# Patient Record
Sex: MALE | Race: WHITE
[De-identification: names, ages, dates, MRNs, and addresses within clinical notes are randomized per-mention and may not be internally consistent; named-entity substitution may affect disease eponyms.]

---

## 2018-07-25 ENCOUNTER — HOSPITAL ENCOUNTER (OUTPATIENT)
Dept: HOSPITAL 83 - SDC | Age: 53
Discharge: HOME | End: 2018-07-25
Attending: INTERNAL MEDICINE
Payer: COMMERCIAL

## 2018-07-25 VITALS — DIASTOLIC BLOOD PRESSURE: 48 MMHG

## 2018-07-25 VITALS — WEIGHT: 185 LBS | BODY MASS INDEX: 28.04 KG/M2 | HEIGHT: 67.99 IN

## 2018-07-25 VITALS — DIASTOLIC BLOOD PRESSURE: 54 MMHG

## 2018-07-25 VITALS — DIASTOLIC BLOOD PRESSURE: 82 MMHG

## 2018-07-25 VITALS — DIASTOLIC BLOOD PRESSURE: 70 MMHG

## 2018-07-25 DIAGNOSIS — K57.30: ICD-10-CM

## 2018-07-25 DIAGNOSIS — K29.50: ICD-10-CM

## 2018-07-25 DIAGNOSIS — E11.9: ICD-10-CM

## 2018-07-25 DIAGNOSIS — Z12.11: Primary | ICD-10-CM

## 2018-07-25 DIAGNOSIS — Z87.891: ICD-10-CM

## 2018-07-25 DIAGNOSIS — Z98.890: ICD-10-CM

## 2018-07-25 DIAGNOSIS — Z79.899: ICD-10-CM

## 2018-10-12 ENCOUNTER — APPOINTMENT (OUTPATIENT)
Dept: CT IMAGING | Age: 53
End: 2018-10-12
Payer: COMMERCIAL

## 2018-10-12 ENCOUNTER — HOSPITAL ENCOUNTER (EMERGENCY)
Age: 53
Discharge: HOME | End: 2018-10-12
Payer: COMMERCIAL

## 2018-10-12 ENCOUNTER — APPOINTMENT (OUTPATIENT)
Dept: GENERAL RADIOLOGY | Age: 53
End: 2018-10-12
Payer: COMMERCIAL

## 2018-10-12 VITALS
WEIGHT: 179 LBS | SYSTOLIC BLOOD PRESSURE: 142 MMHG | BODY MASS INDEX: 26.51 KG/M2 | HEART RATE: 96 BPM | TEMPERATURE: 98.2 F | HEIGHT: 69 IN | OXYGEN SATURATION: 97 % | DIASTOLIC BLOOD PRESSURE: 80 MMHG | RESPIRATION RATE: 17 BRPM

## 2018-10-12 DIAGNOSIS — S20.212A CONTUSION OF RIB ON LEFT SIDE, INITIAL ENCOUNTER: ICD-10-CM

## 2018-10-12 DIAGNOSIS — S01.81XA FACIAL LACERATION, INITIAL ENCOUNTER: ICD-10-CM
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
## 2018-10-12 PROCEDURE — 99283 EMERGENCY DEPT VISIT LOW MDM: CPT

## 2018-10-12 PROCEDURE — 72125 CT NECK SPINE W/O DYE: CPT

## 2018-10-12 PROCEDURE — 90471 IMMUNIZATION ADMIN: CPT | Performed by: NURSE PRACTITIONER

## 2018-10-12 PROCEDURE — 70450 CT HEAD/BRAIN W/O DYE: CPT

## 2018-10-12 PROCEDURE — 6370000000 HC RX 637 (ALT 250 FOR IP): Performed by: NURSE PRACTITIONER

## 2018-10-12 PROCEDURE — 12013 RPR F/E/E/N/L/M 2.6-5.0 CM: CPT

## 2018-10-12 PROCEDURE — 90715 TDAP VACCINE 7 YRS/> IM: CPT | Performed by: NURSE PRACTITIONER

## 2018-10-12 PROCEDURE — 6360000002 HC RX W HCPCS: Performed by: NURSE PRACTITIONER

## 2018-10-12 PROCEDURE — 71101 X-RAY EXAM UNILAT RIBS/CHEST: CPT

## 2018-10-12 RX ORDER — ACETAMINOPHEN 500 MG
1000 TABLET ORAL ONCE
Status: COMPLETED | OUTPATIENT
Start: 2018-10-12 | End: 2018-10-12

## 2018-10-12 RX ORDER — IBUPROFEN 800 MG/1
800 TABLET ORAL EVERY 8 HOURS PRN
Qty: 21 TABLET | Refills: 0 | Status: SHIPPED | OUTPATIENT
Start: 2018-10-12 | End: 2018-10-19

## 2018-10-12 RX ORDER — DIAPER,BRIEF,INFANT-TODD,DISP
EACH MISCELLANEOUS ONCE
Status: COMPLETED | OUTPATIENT
Start: 2018-10-12 | End: 2018-10-12

## 2018-10-12 RX ORDER — IBUPROFEN 800 MG/1
800 TABLET ORAL EVERY 8 HOURS PRN
Qty: 21 TABLET | Refills: 0 | Status: SHIPPED | OUTPATIENT
Start: 2018-10-12 | End: 2018-10-12

## 2018-10-12 RX ADMIN — BACITRACIN ZINC: 500 OINTMENT TOPICAL at 15:53

## 2018-10-12 RX ADMIN — ACETAMINOPHEN 1000 MG: 500 TABLET, FILM COATED ORAL at 15:53

## 2018-10-12 RX ADMIN — TETANUS TOXOID, REDUCED DIPHTHERIA TOXOID AND ACELLULAR PERTUSSIS VACCINE, ADSORBED 0.5 ML: 5; 2.5; 8; 8; 2.5 SUSPENSION INTRAMUSCULAR at 15:46

## 2018-10-12 ASSESSMENT — PAIN SCALES - GENERAL: PAINLEVEL_OUTOF10: 4

## 2018-10-13 NOTE — ED PROVIDER NOTES
Contrast   Final Result   Negative for evidence of acute traumatic findings of the cervical   spine or adjacent soft tissues. Degenerative osteoarthropathy of the cervical spine. CT Head WO Contrast   Final Result   1. Evidence of prior right frontoparietal craniotomy. Cortical   subcortical encephalomalacia of the right frontal lobe   posterosuperiorly could represent old infarct or related to   postsurgical changes. Clinical correlation is recommended. 2. Age-related atrophic changes without evidence of acute intracranial   pathology. If there is concern for CVA, MRI is more sensitive for the detection   of early stages of acute ischemic infarct. ED Course / Medical Decision Making     Medications   Tetanus-Diphth-Acell Pertussis (BOOSTRIX) injection 0.5 mL (0.5 mLs Intramuscular Given 10/12/18 1546)   bacitracin zinc ointment ( Topical Given 10/12/18 1553)   acetaminophen (TYLENOL) tablet 1,000 mg (1,000 mg Oral Given 10/12/18 1553)         Consult(s):   None    Procedure(s):     PROCEDURE NOTE  10/12/18       Time: 1330    LACERATION REPAIR  Risks, benefits and alternatives (for applicable procedures below) described. Performed By: Lennox Rend, APRN - CNP. Laceration #: 1. Location: left eyebrow  Length:   3cm. The wound area was irrigated with sterile saline, cleansend with shur-clens and draped in a sterile fashion. Local Anesthesia:  Lidocaine 1% with epinephrine. The wound was explored with the following results:  no foreign body or tendon injury seen. Debridement: None. Undermining: None. Wound Margins Revised: None. Flaps Aligned: yes. The wound was closed with 5-0 Prolene using interrupted sutures. Dressing:  bacitracin and a sterile dressing was placed. Total number suture:  5. There were no additional lacerations requiring repair.          MDM:   Mechanical fall, CT head and cervical spine obtained, negative for any acute findings,

## 2019-01-11 ENCOUNTER — HOSPITAL ENCOUNTER (EMERGENCY)
Dept: HOSPITAL 83 - ED | Age: 54
Discharge: HOME | End: 2019-01-11
Payer: COMMERCIAL

## 2019-01-11 VITALS — WEIGHT: 192 LBS | HEIGHT: 68.98 IN | BODY MASS INDEX: 28.44 KG/M2

## 2019-01-11 DIAGNOSIS — R51: Primary | ICD-10-CM

## 2019-01-11 DIAGNOSIS — Z79.82: ICD-10-CM

## 2019-01-11 DIAGNOSIS — Z79.899: ICD-10-CM

## 2019-01-11 DIAGNOSIS — R79.1: ICD-10-CM

## 2019-01-11 LAB
ALBUMIN SERPL-MCNC: 3.9 GM/DL (ref 3.1–4.5)
ALP SERPL-CCNC: 85 U/L (ref 45–117)
ALT SERPL W P-5'-P-CCNC: 39 U/L (ref 12–78)
APTT PPP: 22.3 SECONDS (ref 20.8–31.5)
AST SERPL-CCNC: 18 IU/L (ref 3–35)
BASOPHILS # BLD AUTO: 0 10*3/UL (ref 0–0.1)
BASOPHILS NFR BLD AUTO: 0.8 % (ref 0–1)
BUN SERPL-MCNC: 19 MG/DL (ref 7–24)
CHLORIDE SERPL-SCNC: 106 MMOL/L (ref 98–107)
CREAT SERPL-MCNC: 1.03 MG/DL (ref 0.7–1.3)
EOSINOPHIL # BLD AUTO: 0.1 10*3/UL (ref 0–0.4)
EOSINOPHIL # BLD AUTO: 2.3 % (ref 1–4)
ERYTHROCYTE [DISTWIDTH] IN BLOOD BY AUTOMATED COUNT: 12.4 % (ref 0–14.5)
HCT VFR BLD AUTO: 45.4 % (ref 42–52)
HGB BLD-MCNC: 15.4 G/DL (ref 14–18)
INR BLD: 1 (ref 2–3.5)
LYMPHOCYTES # BLD AUTO: 1.5 10*3/UL (ref 1.3–4.4)
LYMPHOCYTES NFR BLD AUTO: 28.4 % (ref 27–41)
MCH RBC QN AUTO: 30.7 PG (ref 27–31)
MCHC RBC AUTO-ENTMCNC: 33.9 G/DL (ref 33–37)
MCV RBC AUTO: 90.6 FL (ref 80–94)
MONOCYTES # BLD AUTO: 0.5 10*3/UL (ref 0.1–1)
MONOCYTES NFR BLD MANUAL: 9 % (ref 3–9)
NEUT #: 3.1 10*3/UL (ref 2.3–7.9)
NEUT %: 59.1 % (ref 47–73)
NRBC BLD QL AUTO: 0 % (ref 0–0)
PLATELET # BLD AUTO: 167 10*3/UL (ref 130–400)
PMV BLD AUTO: 9.6 FL (ref 9.6–12.3)
POTASSIUM SERPL-SCNC: 4.2 MMOL/L (ref 3.5–5.1)
PROT SERPL-MCNC: 6.9 GM/DL (ref 6.4–8.2)
RBC # BLD AUTO: 5.01 10*6/UL (ref 4.5–5.9)
SODIUM SERPL-SCNC: 141 MMOL/L (ref 136–145)
WBC NRBC COR # BLD AUTO: 5.2 10*3/UL (ref 4.8–10.8)

## 2019-07-30 ENCOUNTER — HOSPITAL ENCOUNTER (OUTPATIENT)
Dept: HOSPITAL 83 - US | Age: 54
Discharge: HOME | End: 2019-07-30
Payer: COMMERCIAL

## 2019-07-30 DIAGNOSIS — E78.5: ICD-10-CM

## 2019-07-30 DIAGNOSIS — E11.9: ICD-10-CM

## 2019-07-30 DIAGNOSIS — I10: ICD-10-CM

## 2019-07-30 DIAGNOSIS — R10.31: Primary | ICD-10-CM

## 2019-08-20 ENCOUNTER — HOSPITAL ENCOUNTER (OUTPATIENT)
Dept: HOSPITAL 83 - US | Age: 54
Discharge: HOME | End: 2019-08-20
Payer: COMMERCIAL

## 2019-08-20 DIAGNOSIS — N20.0: ICD-10-CM

## 2019-08-20 DIAGNOSIS — N28.9: Primary | ICD-10-CM

## 2019-08-25 ENCOUNTER — HOSPITAL ENCOUNTER (OUTPATIENT)
Dept: HOSPITAL 83 - LAB | Age: 54
Discharge: HOME | End: 2019-08-25
Attending: INTERNAL MEDICINE
Payer: COMMERCIAL

## 2019-08-25 DIAGNOSIS — E55.9: Primary | ICD-10-CM

## 2019-08-25 DIAGNOSIS — E11.65: ICD-10-CM

## 2019-08-25 LAB
ALBUMIN SERPL-MCNC: 4 GM/DL (ref 3.1–4.5)
ALP SERPL-CCNC: 97 U/L (ref 45–117)
ALT SERPL W P-5'-P-CCNC: 29 U/L (ref 12–78)
AST SERPL-CCNC: 20 IU/L (ref 3–35)
BUN SERPL-MCNC: 12 MG/DL (ref 7–24)
CHLORIDE SERPL-SCNC: 105 MMOL/L (ref 98–107)
CHOLEST SERPL-MCNC: 100 MG/DL (ref ?–200)
CREAT SERPL-MCNC: 1.14 MG/DL (ref 0.7–1.3)
HDLC SERPL-MCNC: 30 MG/DL (ref 40–60)
LDLC SERPL DIRECT ASSAY-MCNC: 36 MG/DL (ref 9–159)
POTASSIUM SERPL-SCNC: 3.9 MMOL/L (ref 3.5–5.1)
PROT SERPL-MCNC: 7.1 GM/DL (ref 6.4–8.2)
SODIUM SERPL-SCNC: 139 MMOL/L (ref 136–145)
TRIGL SERPL-MCNC: 170 MG/DL (ref ?–150)
VLDLC SERPL CALC-MCNC: 34 MG/DL (ref 6–40)

## 2019-08-27 LAB
CREAT UR-MCNC: 52.2 MG/DL
MICRO ALBUMIN/CRE RATIO: 8.4 (ref 0–30)

## 2019-11-24 ENCOUNTER — HOSPITAL ENCOUNTER (OUTPATIENT)
Dept: HOSPITAL 83 - LAB | Age: 54
Discharge: HOME | End: 2019-11-24
Attending: INTERNAL MEDICINE
Payer: COMMERCIAL

## 2019-11-24 DIAGNOSIS — E55.9: ICD-10-CM

## 2019-11-24 DIAGNOSIS — E53.8: ICD-10-CM

## 2019-11-24 DIAGNOSIS — E11.65: Primary | ICD-10-CM

## 2019-11-24 LAB
25(OH)D3 SERPL-MCNC: 41.8 NG/ML (ref 30–100)
ALBUMIN SERPL-MCNC: 3.8 GM/DL (ref 3.1–4.5)
ALP SERPL-CCNC: 123 U/L (ref 45–117)
ALT SERPL W P-5'-P-CCNC: 28 U/L (ref 12–78)
AST SERPL-CCNC: 16 IU/L (ref 3–35)
BUN SERPL-MCNC: 16 MG/DL (ref 7–24)
CHLORIDE SERPL-SCNC: 105 MMOL/L (ref 98–107)
CHOLEST SERPL-MCNC: 109 MG/DL (ref ?–200)
CREAT SERPL-MCNC: 0.94 MG/DL (ref 0.7–1.3)
HDLC SERPL-MCNC: 30 MG/DL (ref 40–60)
LDLC SERPL DIRECT ASSAY-MCNC: 51 MG/DL (ref 9–159)
POTASSIUM SERPL-SCNC: 4.2 MMOL/L (ref 3.5–5.1)
PROT SERPL-MCNC: 7.5 GM/DL (ref 6.4–8.2)
SODIUM SERPL-SCNC: 140 MMOL/L (ref 136–145)
TRIGL SERPL-MCNC: 140 MG/DL (ref ?–150)
VITAMIN B12: 306 PG/ML (ref 247–911)
VLDLC SERPL CALC-MCNC: 28 MG/DL (ref 6–40)

## 2020-01-09 ENCOUNTER — HOSPITAL ENCOUNTER (EMERGENCY)
Dept: HOSPITAL 83 - ED | Age: 55
Discharge: HOME | End: 2020-01-09
Payer: COMMERCIAL

## 2020-01-09 VITALS — BODY MASS INDEX: 24.64 KG/M2 | WEIGHT: 157 LBS | HEIGHT: 66.97 IN

## 2020-01-09 DIAGNOSIS — Z79.899: ICD-10-CM
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
## 2020-01-09 DIAGNOSIS — S80.12XA: Primary | ICD-10-CM
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
## 2020-07-10 ENCOUNTER — HOSPITAL ENCOUNTER (OUTPATIENT)
Dept: HOSPITAL 83 - RESCLI | Age: 55
Discharge: HOME | End: 2020-07-10
Attending: INTERNAL MEDICINE
Payer: SELF-PAY

## 2020-07-10 DIAGNOSIS — E55.9: ICD-10-CM

## 2020-07-10 DIAGNOSIS — I10: ICD-10-CM

## 2020-07-10 DIAGNOSIS — K21.9: ICD-10-CM

## 2020-07-10 DIAGNOSIS — E78.2: ICD-10-CM

## 2020-07-10 DIAGNOSIS — E11.65: Primary | ICD-10-CM

## 2020-07-11 ENCOUNTER — HOSPITAL ENCOUNTER (OUTPATIENT)
Dept: HOSPITAL 83 - LAB | Age: 55
Discharge: HOME | End: 2020-07-11
Attending: STUDENT IN AN ORGANIZED HEALTH CARE EDUCATION/TRAINING PROGRAM
Payer: SELF-PAY

## 2020-07-11 DIAGNOSIS — E78.2: ICD-10-CM

## 2020-07-11 DIAGNOSIS — I10: ICD-10-CM

## 2020-07-11 DIAGNOSIS — E11.65: Primary | ICD-10-CM

## 2020-07-11 LAB
ALBUMIN SERPL-MCNC: 3.5 GM/DL (ref 3.1–4.5)
ALP SERPL-CCNC: 142 U/L (ref 45–117)
ALT SERPL W P-5'-P-CCNC: 29 U/L (ref 12–78)
AST SERPL-CCNC: 11 IU/L (ref 3–35)
BASOPHILS # BLD AUTO: 0 10*3/UL (ref 0–0.1)
BASOPHILS NFR BLD AUTO: 0.7 % (ref 0–1)
BUN SERPL-MCNC: 13 MG/DL (ref 7–24)
CHLORIDE SERPL-SCNC: 103 MMOL/L (ref 98–107)
CHOLEST SERPL-MCNC: 116 MG/DL (ref ?–200)
CREAT SERPL-MCNC: 0.95 MG/DL (ref 0.7–1.3)
EOSINOPHIL # BLD AUTO: 0.2 10*3/UL (ref 0–0.4)
EOSINOPHIL # BLD AUTO: 3.2 % (ref 1–4)
ERYTHROCYTE [DISTWIDTH] IN BLOOD BY AUTOMATED COUNT: 11.9 % (ref 0–14.5)
HCT VFR BLD AUTO: 43.7 % (ref 42–52)
HDLC SERPL-MCNC: 33 MG/DL (ref 40–60)
LDLC SERPL DIRECT ASSAY-MCNC: 42 MG/DL (ref 9–159)
LYMPHOCYTES # BLD AUTO: 1.7 10*3/UL (ref 1.3–4.4)
LYMPHOCYTES NFR BLD AUTO: 30.1 % (ref 27–41)
MCH RBC QN AUTO: 30.3 PG (ref 27–31)
MCHC RBC AUTO-ENTMCNC: 34.1 G/DL (ref 33–37)
MCV RBC AUTO: 89 FL (ref 80–94)
MONOCYTES # BLD AUTO: 0.5 10*3/UL (ref 0.1–1)
MONOCYTES NFR BLD MANUAL: 8.3 % (ref 3–9)
NEUT #: 3.2 10*3/UL (ref 2.3–7.9)
NEUT %: 57.5 % (ref 47–73)
NRBC BLD QL AUTO: 0 10*3/UL (ref 0–0)
PLATELET # BLD AUTO: 164 10*3/UL (ref 130–400)
PMV BLD AUTO: 10.2 FL (ref 9.6–12.3)
POTASSIUM SERPL-SCNC: 3.9 MMOL/L (ref 3.5–5.1)
PROT SERPL-MCNC: 6.9 GM/DL (ref 6.4–8.2)
RBC # BLD AUTO: 4.91 10*6/UL (ref 4.5–5.9)
SODIUM SERPL-SCNC: 137 MMOL/L (ref 136–145)
TRIGL SERPL-MCNC: 205 MG/DL (ref ?–150)
VLDLC SERPL CALC-MCNC: 41 MG/DL (ref 6–40)
WBC NRBC COR # BLD AUTO: 5.6 10*3/UL (ref 4.8–10.8)

## 2020-07-13 LAB
CREAT UR-MCNC: 132.6 MG/DL
MICRO ALBUMIN/CRE RATIO: 11 (ref 0–29)

## 2020-08-13 ENCOUNTER — HOSPITAL ENCOUNTER (OUTPATIENT)
Dept: HOSPITAL 83 - RESCLI | Age: 55
Discharge: HOME | End: 2020-08-13
Attending: STUDENT IN AN ORGANIZED HEALTH CARE EDUCATION/TRAINING PROGRAM
Payer: SELF-PAY

## 2020-08-13 DIAGNOSIS — K21.9: ICD-10-CM

## 2020-08-13 DIAGNOSIS — I10: ICD-10-CM

## 2020-08-13 DIAGNOSIS — E78.2: ICD-10-CM

## 2020-08-13 DIAGNOSIS — E11.65: Primary | ICD-10-CM

## 2020-08-13 DIAGNOSIS — E55.9: ICD-10-CM

## 2020-10-20 ENCOUNTER — HOSPITAL ENCOUNTER (OUTPATIENT)
Dept: HOSPITAL 83 - RESCLI | Age: 55
Discharge: HOME | End: 2020-10-20
Attending: STUDENT IN AN ORGANIZED HEALTH CARE EDUCATION/TRAINING PROGRAM
Payer: SELF-PAY

## 2020-10-20 DIAGNOSIS — I10: ICD-10-CM

## 2020-10-20 DIAGNOSIS — Z79.899: ICD-10-CM

## 2020-10-20 DIAGNOSIS — L08.9: ICD-10-CM

## 2020-10-20 DIAGNOSIS — K21.9: ICD-10-CM

## 2020-10-20 DIAGNOSIS — E78.2: ICD-10-CM

## 2020-10-20 DIAGNOSIS — E11.65: Primary | ICD-10-CM

## 2020-10-20 DIAGNOSIS — Z98.890: ICD-10-CM

## 2020-10-20 LAB
ALBUMIN SERPL-MCNC: 3.8 GM/DL (ref 3.1–4.5)
ALP SERPL-CCNC: 112 U/L (ref 45–117)
ALT SERPL W P-5'-P-CCNC: 32 U/L (ref 12–78)
AST SERPL-CCNC: 15 IU/L (ref 3–35)
BASOPHILS # BLD AUTO: 0 10*3/UL (ref 0–0.1)
BASOPHILS NFR BLD AUTO: 0.7 % (ref 0–1)
BUN SERPL-MCNC: 10 MG/DL (ref 7–24)
CHLORIDE SERPL-SCNC: 104 MMOL/L (ref 98–107)
CREAT SERPL-MCNC: 0.91 MG/DL (ref 0.7–1.3)
EOSINOPHIL # BLD AUTO: 0.2 10*3/UL (ref 0–0.4)
EOSINOPHIL # BLD AUTO: 2.7 % (ref 1–4)
ERYTHROCYTE [DISTWIDTH] IN BLOOD BY AUTOMATED COUNT: 12.2 % (ref 0–14.5)
HCT VFR BLD AUTO: 44.4 % (ref 42–52)
LYMPHOCYTES # BLD AUTO: 1.4 10*3/UL (ref 1.3–4.4)
LYMPHOCYTES NFR BLD AUTO: 24.4 % (ref 27–41)
MCH RBC QN AUTO: 30.7 PG (ref 27–31)
MCHC RBC AUTO-ENTMCNC: 34 G/DL (ref 33–37)
MCV RBC AUTO: 90.2 FL (ref 80–94)
MONOCYTES # BLD AUTO: 0.4 10*3/UL (ref 0.1–1)
MONOCYTES NFR BLD MANUAL: 6.3 % (ref 3–9)
NEUT #: 3.6 10*3/UL (ref 2.3–7.9)
NEUT %: 65.4 % (ref 47–73)
NRBC BLD QL AUTO: 0 10*3/UL (ref 0–0)
PLATELET # BLD AUTO: 184 10*3/UL (ref 130–400)
PMV BLD AUTO: 9.8 FL (ref 9.6–12.3)
POTASSIUM SERPL-SCNC: 4.1 MMOL/L (ref 3.5–5.1)
PROT SERPL-MCNC: 7.7 GM/DL (ref 6.4–8.2)
RBC # BLD AUTO: 4.92 10*6/UL (ref 4.5–5.9)
SODIUM SERPL-SCNC: 140 MMOL/L (ref 136–145)
WBC NRBC COR # BLD AUTO: 5.6 10*3/UL (ref 4.8–10.8)

## 2020-10-30 ENCOUNTER — HOSPITAL ENCOUNTER (OUTPATIENT)
Dept: HOSPITAL 83 - RESCLI | Age: 55
Discharge: HOME | End: 2020-10-30
Attending: STUDENT IN AN ORGANIZED HEALTH CARE EDUCATION/TRAINING PROGRAM
Payer: SELF-PAY

## 2020-10-30 DIAGNOSIS — Z23: ICD-10-CM

## 2020-10-30 DIAGNOSIS — K21.9: Primary | ICD-10-CM

## 2020-10-30 DIAGNOSIS — I10: ICD-10-CM

## 2020-10-30 DIAGNOSIS — E78.2: ICD-10-CM

## 2020-10-30 DIAGNOSIS — Z79.899: ICD-10-CM

## 2020-10-30 DIAGNOSIS — E11.65: ICD-10-CM

## 2020-10-30 DIAGNOSIS — Z98.890: ICD-10-CM

## 2021-05-22 ENCOUNTER — HOSPITAL ENCOUNTER (OUTPATIENT)
Dept: HOSPITAL 83 - LAB | Age: 56
Discharge: HOME | End: 2021-05-22
Attending: INTERNAL MEDICINE
Payer: COMMERCIAL

## 2021-05-22 DIAGNOSIS — E11.65: Primary | ICD-10-CM

## 2021-05-22 DIAGNOSIS — E53.8: ICD-10-CM

## 2021-05-22 DIAGNOSIS — E55.9: ICD-10-CM

## 2021-05-22 LAB
25(OH)D3 SERPL-MCNC: 61.2 NG/ML (ref 30–100)
ALBUMIN SERPL-MCNC: 3.5 GM/DL (ref 3.1–4.5)
ALP SERPL-CCNC: 106 U/L (ref 45–117)
ALT SERPL W P-5'-P-CCNC: 30 U/L (ref 12–78)
AST SERPL-CCNC: 16 IU/L (ref 3–35)
BUN SERPL-MCNC: 15 MG/DL (ref 7–24)
CHLORIDE SERPL-SCNC: 106 MMOL/L (ref 98–107)
CHOLEST SERPL-MCNC: 103 MG/DL (ref ?–200)
CREAT SERPL-MCNC: 0.85 MG/DL (ref 0.7–1.3)
LDLC SERPL DIRECT ASSAY-MCNC: 45 MG/DL (ref 9–159)
POTASSIUM SERPL-SCNC: 3.9 MMOL/L (ref 3.5–5.1)
PROT SERPL-MCNC: 7.1 GM/DL (ref 6.4–8.2)
SODIUM SERPL-SCNC: 140 MMOL/L (ref 136–145)
TRIGL SERPL-MCNC: 157 MG/DL (ref ?–150)
VITAMIN B12: 447 PG/ML (ref 247–911)

## 2021-05-23 LAB
CREAT UR-MCNC: 90.2 MG/DL
MICRO ALBUMIN/CRE RATIO: 10 (ref 0–29)

## 2022-03-07 ENCOUNTER — HOSPITAL ENCOUNTER (EMERGENCY)
Dept: HOSPITAL 83 - ED | Age: 57
Discharge: HOME | End: 2022-03-07
Payer: COMMERCIAL

## 2022-03-07 VITALS — WEIGHT: 160 LBS | BODY MASS INDEX: 24.25 KG/M2 | HEIGHT: 67.99 IN

## 2022-03-07 DIAGNOSIS — K21.9: ICD-10-CM

## 2022-03-07 DIAGNOSIS — E11.9: ICD-10-CM

## 2022-03-07 DIAGNOSIS — Z79.899: ICD-10-CM

## 2022-03-07 DIAGNOSIS — R51.9: Primary | ICD-10-CM

## 2022-03-07 LAB
ALP SERPL-CCNC: 89 U/L (ref 45–117)
ALT SERPL W P-5'-P-CCNC: 28 U/L (ref 12–78)
APTT PPP: 24.8 SECONDS (ref 20–32.1)
AST SERPL-CCNC: 12 IU/L (ref 3–35)
BASOPHILS # BLD AUTO: 0 10*3/UL (ref 0–0.1)
BASOPHILS NFR BLD AUTO: 0.5 % (ref 0–1)
BUN SERPL-MCNC: 12 MG/DL (ref 7–24)
CHLORIDE SERPL-SCNC: 107 MMOL/L (ref 98–107)
CREAT SERPL-MCNC: 0.95 MG/DL (ref 0.7–1.3)
EOSINOPHIL # BLD AUTO: 0.3 10*3/UL (ref 0–0.4)
EOSINOPHIL # BLD AUTO: 3.6 % (ref 1–4)
ERYTHROCYTE [DISTWIDTH] IN BLOOD BY AUTOMATED COUNT: 12.7 % (ref 0–14.5)
HCT VFR BLD AUTO: 41.8 % (ref 42–52)
INR BLD: 1 (ref 2–3.5)
LIPASE SERPL-CCNC: 73 U/L (ref 73–393)
LYMPHOCYTES # BLD AUTO: 1.4 10*3/UL (ref 1.3–4.4)
LYMPHOCYTES NFR BLD AUTO: 19.3 % (ref 27–41)
MCH RBC QN AUTO: 30.5 PG (ref 27–31)
MCHC RBC AUTO-ENTMCNC: 33.5 G/DL (ref 33–37)
MCV RBC AUTO: 91.1 FL (ref 80–94)
MONOCYTES # BLD AUTO: 0.5 10*3/UL (ref 0.1–1)
MONOCYTES NFR BLD MANUAL: 6.3 % (ref 3–9)
NEUT #: 5.1 10*3/UL (ref 2.3–7.9)
NEUT %: 70 % (ref 47–73)
NRBC BLD QL AUTO: 0 10*3/UL (ref 0–0)
PLATELET # BLD AUTO: 167 10*3/UL (ref 130–400)
PMV BLD AUTO: 9.7 FL (ref 9.6–12.3)
POTASSIUM SERPL-SCNC: 3.8 MMOL/L (ref 3.5–5.1)
PROT SERPL-MCNC: 6.6 GM/DL (ref 6.4–8.2)
RBC # BLD AUTO: 4.59 10*6/UL (ref 4.5–5.9)
SODIUM SERPL-SCNC: 140 MMOL/L (ref 136–145)
WBC NRBC COR # BLD AUTO: 7.3 10*3/UL (ref 4.8–10.8)

## 2023-06-30 ENCOUNTER — HOSPITAL ENCOUNTER (OUTPATIENT)
Dept: HOSPITAL 83 - LAB | Age: 58
Discharge: HOME | End: 2023-06-30
Attending: INTERNAL MEDICINE
Payer: COMMERCIAL

## 2023-06-30 DIAGNOSIS — E55.9: Primary | ICD-10-CM

## 2023-06-30 DIAGNOSIS — E11.65: ICD-10-CM

## 2023-06-30 DIAGNOSIS — E78.5: ICD-10-CM

## 2023-06-30 LAB
ALP SERPL-CCNC: 80 U/L (ref 46–116)
ALT SERPL W P-5'-P-CCNC: 40 U/L (ref 10–49)
BUN SERPL-MCNC: 13 MG/DL (ref 9–23)
CHLORIDE SERPL-SCNC: 103 MMOL/L (ref 98–107)
CHOLEST SERPL-MCNC: 107 MG/DL (ref ?–200)
LDLC SERPL DIRECT ASSAY-MCNC: 48 MG/DL (ref 9–159)
MUCOUS THREADS URNS QL MICRO: (no result)
PH UR STRIP: 5 [PH] (ref 4.5–8)
POTASSIUM SERPL-SCNC: 4.1 MMOL/L (ref 3.4–5.1)
PROT SERPL-MCNC: 6.8 GM/DL (ref 6–8)
RBC #/AREA URNS HPF: (no result) RBC/HPF (ref 0–2)
SP GR UR: 1.02 (ref 1–1.03)
T4 FREE SERPL-MCNC: 1.19 NG/DL (ref 0.89–1.76)
TRIGL SERPL-MCNC: 113 MG/DL (ref ?–150)
TSH SERPL DL<=0.005 MIU/L-ACNC: 2.06 UIU/ML (ref 0.55–4.78)
UROBILINOGEN UR STRIP-MCNC: 0.2 E.U./DL (ref 0–1)
WBC #/AREA URNS HPF: (no result) WBC/HPF (ref 0–5)

## 2023-08-11 ENCOUNTER — HOSPITAL ENCOUNTER (OUTPATIENT)
Dept: HOSPITAL 83 - LAB | Age: 58
Discharge: HOME | End: 2023-08-11
Attending: INTERNAL MEDICINE
Payer: COMMERCIAL

## 2023-08-11 DIAGNOSIS — E34.9: Primary | ICD-10-CM

## 2024-02-27 ENCOUNTER — HOSPITAL ENCOUNTER (OUTPATIENT)
Dept: HOSPITAL 83 - LAB | Age: 59
Discharge: HOME | End: 2024-02-27
Attending: INTERNAL MEDICINE
Payer: COMMERCIAL

## 2024-02-27 DIAGNOSIS — E55.9: ICD-10-CM

## 2024-02-27 DIAGNOSIS — E34.9: ICD-10-CM

## 2024-02-27 DIAGNOSIS — E11.65: Primary | ICD-10-CM

## 2024-02-27 DIAGNOSIS — E78.5: ICD-10-CM

## 2024-02-27 LAB
ALP SERPL-CCNC: 109 U/L (ref 46–116)
ALT SERPL W P-5'-P-CCNC: 21 U/L (ref 5–49)
BUN SERPL-MCNC: 17 MG/DL (ref 9–23)
CHLORIDE SERPL-SCNC: 104 MMOL/L (ref 98–107)
CHOLEST SERPL-MCNC: 118 MG/DL (ref ?–200)
EPI CELLS #/AREA URNS HPF: (no result) /[HPF]
GLUCOSE UR QL: (no result)
LDLC SERPL DIRECT ASSAY-MCNC: 52 MG/DL (ref 9–159)
PH UR STRIP: 5 [PH] (ref 4.5–8)
POTASSIUM SERPL-SCNC: 4.3 MMOL/L (ref 3.4–5.1)
PROT SERPL-MCNC: 7.4 GM/DL (ref 6–8)
RBC #/AREA URNS HPF: (no result) RBC/HPF (ref 0–2)
SP GR UR: >= 1.03 (ref 1–1.03)
TRIGL SERPL-MCNC: 180 MG/DL (ref ?–150)
UROBILINOGEN UR STRIP-MCNC: 0.2 E.U./DL (ref 0–1)

## 2024-08-07 ENCOUNTER — HOSPITAL ENCOUNTER (OUTPATIENT)
Dept: HOSPITAL 83 - LAB | Age: 59
Discharge: HOME | End: 2024-08-07
Attending: INTERNAL MEDICINE
Payer: COMMERCIAL

## 2024-08-07 DIAGNOSIS — E04.9: ICD-10-CM

## 2024-08-07 DIAGNOSIS — E11.65: Primary | ICD-10-CM

## 2024-08-07 DIAGNOSIS — E78.5: ICD-10-CM

## 2024-08-07 DIAGNOSIS — E55.9: ICD-10-CM

## 2024-08-07 LAB
ALP SERPL-CCNC: 97 U/L (ref 46–116)
ALT SERPL W P-5'-P-CCNC: 23 U/L (ref 5–49)
BUN SERPL-MCNC: 12 MG/DL (ref 9–23)
CHLORIDE SERPL-SCNC: 106 MMOL/L (ref 98–107)
CHOLEST SERPL-MCNC: 121 MG/DL (ref ?–200)
EPI CELLS #/AREA URNS HPF: (no result) /[HPF]
GLUCOSE UR QL: (no result)
LDLC SERPL DIRECT ASSAY-MCNC: 66 MG/DL (ref 9–159)
PH UR STRIP: 6.5 [PH] (ref 4.5–8)
POTASSIUM SERPL-SCNC: 4 MMOL/L (ref 3.4–5.1)
PROT SERPL-MCNC: 7 GM/DL (ref 6–8)
SP GR UR: >= 1.03 (ref 1–1.03)
TRIGL SERPL-MCNC: 113 MG/DL (ref ?–150)
UROBILINOGEN UR STRIP-MCNC: 1 E.U./DL (ref 0–1)
WBC #/AREA URNS HPF: (no result) WBC/HPF (ref 0–5)

## 2024-11-29 ENCOUNTER — HOSPITAL ENCOUNTER (EMERGENCY)
Dept: HOSPITAL 83 - ED | Age: 59
Discharge: HOME | End: 2024-11-29
Payer: COMMERCIAL

## 2024-11-29 VITALS — BODY MASS INDEX: 26.66 KG/M2 | WEIGHT: 180 LBS | HEIGHT: 68.98 IN

## 2024-11-29 DIAGNOSIS — K21.9: ICD-10-CM

## 2024-11-29 DIAGNOSIS — Z98.890: ICD-10-CM

## 2024-11-29 DIAGNOSIS — E11.9: ICD-10-CM

## 2024-11-29 DIAGNOSIS — R10.9: Primary | ICD-10-CM

## 2024-11-29 LAB
ALP SERPL-CCNC: 84 U/L (ref 46–116)
ALT SERPL W P-5'-P-CCNC: 18 U/L (ref 5–49)
BACTERIA #/AREA URNS HPF: (no result) /[HPF]
BASOPHILS # BLD AUTO: 0 10*3/UL (ref 0–0.1)
BASOPHILS NFR BLD AUTO: 0.6 % (ref 0–1)
BUN SERPL-MCNC: 18 MG/DL (ref 9–23)
CHLORIDE SERPL-SCNC: 106 MMOL/L (ref 98–107)
EOSINOPHIL # BLD AUTO: 0.5 10*3/UL (ref 0–0.4)
EOSINOPHIL # BLD AUTO: 7.6 % (ref 1–4)
EPI CELLS #/AREA URNS HPF: (no result) /[HPF]
ERYTHROCYTE [DISTWIDTH] IN BLOOD BY AUTOMATED COUNT: 13.8 % (ref 0–14.5)
GLUCOSE UR QL: (no result)
HCT VFR BLD AUTO: 45.6 % (ref 42–52)
MCH RBC QN AUTO: 26.8 PG (ref 27–31)
MCHC RBC AUTO-ENTMCNC: 31.6 G/DL (ref 33–37)
MCV RBC AUTO: 84.9 FL (ref 80–94)
MONOCYTES # BLD AUTO: 0.4 10*3/UL (ref 0.1–1)
MONOCYTES NFR BLD MANUAL: 6.5 % (ref 3–9)
NEUT #: 4.1 10*3/UL (ref 2.3–7.9)
NEUT %: 63.8 % (ref 47–73)
NRBC BLD QL AUTO: 0 % (ref 0–0)
PH UR STRIP: 5 [PH] (ref 4.5–8)
PLATELET # BLD AUTO: 179 10*3/UL (ref 130–400)
PMV BLD AUTO: 9.6 FL (ref 9.6–12.3)
POTASSIUM SERPL-SCNC: 3.9 MMOL/L (ref 3.4–5.1)
PROT SERPL-MCNC: 7.1 GM/DL (ref 6–8)
RBC # BLD AUTO: 5.37 10*6/UL (ref 4.5–5.9)
RBC #/AREA URNS HPF: (no result) RBC/HPF (ref 0–2)
SP GR UR: >= 1.03 (ref 1–1.03)
UROBILINOGEN UR STRIP-MCNC: 0.2 E.U./DL (ref 0–1)
WBC #/AREA URNS HPF: (no result) WBC/HPF (ref 0–5)
WBC NRBC COR # BLD AUTO: 6.4 10*3/UL (ref 4.8–10.8)

## 2024-12-09 ENCOUNTER — HOSPITAL ENCOUNTER (OUTPATIENT)
Dept: HOSPITAL 83 - LAB | Age: 59
Discharge: HOME | End: 2024-12-09
Attending: INTERNAL MEDICINE
Payer: COMMERCIAL

## 2024-12-09 DIAGNOSIS — E11.65: Primary | ICD-10-CM

## 2024-12-09 DIAGNOSIS — E55.9: ICD-10-CM

## 2024-12-09 DIAGNOSIS — E04.9: ICD-10-CM

## 2024-12-09 DIAGNOSIS — E78.5: ICD-10-CM

## 2024-12-09 LAB
ALP SERPL-CCNC: 85 U/L (ref 46–116)
ALT SERPL W P-5'-P-CCNC: 17 U/L (ref 5–49)
BUN SERPL-MCNC: 22 MG/DL (ref 9–23)
CHLORIDE SERPL-SCNC: 104 MMOL/L (ref 98–107)
CHOLEST SERPL-MCNC: 119 MG/DL (ref ?–200)
EPI CELLS #/AREA URNS HPF: (no result) /[HPF]
GLUCOSE UR QL: (no result)
LDLC SERPL DIRECT ASSAY-MCNC: 63 MG/DL (ref 9–159)
PH UR STRIP: 5.5 [PH] (ref 4.5–8)
POTASSIUM SERPL-SCNC: 4.5 MMOL/L (ref 3.4–5.1)
PROT SERPL-MCNC: 7.6 GM/DL (ref 6–8)
RBC #/AREA URNS HPF: (no result) RBC/HPF (ref 0–2)
SP GR UR: >= 1.03 (ref 1–1.03)
TRIGL SERPL-MCNC: 133 MG/DL (ref ?–150)
UROBILINOGEN UR STRIP-MCNC: 0.2 E.U./DL (ref 0–1)

## 2025-01-16 ENCOUNTER — HOSPITAL ENCOUNTER (OUTPATIENT)
Dept: HOSPITAL 83 - LAB | Age: 60
Discharge: HOME | End: 2025-01-16
Attending: INTERNAL MEDICINE
Payer: COMMERCIAL

## 2025-01-16 DIAGNOSIS — E24.9: Primary | ICD-10-CM

## 2025-03-14 ENCOUNTER — HOSPITAL ENCOUNTER (OUTPATIENT)
Dept: HOSPITAL 83 - LAB | Age: 60
Discharge: HOME | End: 2025-03-14
Attending: INTERNAL MEDICINE
Payer: COMMERCIAL

## 2025-03-14 DIAGNOSIS — E11.65: Primary | ICD-10-CM

## 2025-03-14 DIAGNOSIS — E78.5: ICD-10-CM

## 2025-03-14 DIAGNOSIS — E34.9: ICD-10-CM

## 2025-03-14 DIAGNOSIS — E04.9: ICD-10-CM

## 2025-03-14 DIAGNOSIS — E55.9: ICD-10-CM

## 2025-03-14 LAB
ALP SERPL-CCNC: 80 U/L (ref 46–116)
ALT SERPL W P-5'-P-CCNC: 17 U/L (ref 5–49)
BUN SERPL-MCNC: 17 MG/DL (ref 9–23)
CHLORIDE SERPL-SCNC: 104 MMOL/L (ref 98–107)
CHOLEST SERPL-MCNC: 101 MG/DL (ref ?–200)
GLUCOSE UR QL: (no result)
LDLC SERPL DIRECT ASSAY-MCNC: 56 MG/DL (ref 9–159)
MUCOUS THREADS URNS QL MICRO: (no result)
PH UR STRIP: 5 [PH] (ref 4.5–8)
POTASSIUM SERPL-SCNC: 3.8 MMOL/L (ref 3.4–5.1)
PROT SERPL-MCNC: 7.2 GM/DL (ref 6–8)
SP GR UR: >= 1.03 (ref 1–1.03)
TRIGL SERPL-MCNC: 83 MG/DL (ref ?–150)
UROBILINOGEN UR STRIP-MCNC: 1 E.U./DL (ref 0–1)
WBC #/AREA URNS HPF: (no result) WBC/HPF (ref 0–5)

## 2025-06-14 ENCOUNTER — HOSPITAL ENCOUNTER (OUTPATIENT)
Dept: HOSPITAL 83 - LAB | Age: 60
Discharge: HOME | End: 2025-06-14
Attending: INTERNAL MEDICINE
Payer: COMMERCIAL

## 2025-06-14 DIAGNOSIS — E78.5: ICD-10-CM

## 2025-06-14 DIAGNOSIS — E11.65: Primary | ICD-10-CM

## 2025-06-14 DIAGNOSIS — E55.9: ICD-10-CM

## 2025-06-14 DIAGNOSIS — E04.9: ICD-10-CM

## 2025-06-14 LAB
ALP SERPL-CCNC: 86 U/L (ref 46–116)
ALT SERPL W P-5'-P-CCNC: 20 U/L (ref 5–49)
APPEARANCE UR: CLEAR
BACTERIA #/AREA URNS HPF: (no result) /[HPF]
BILIRUB UR QL STRIP: NEGATIVE
BUN SERPL-MCNC: 12 MG/DL (ref 9–23)
CASTS URNS QL MICRO: (no result)
CHLORIDE SERPL-SCNC: 105 MMOL/L (ref 98–107)
CHOLEST SERPL-MCNC: 120 MG/DL (ref ?–200)
COLOR UR: YELLOW
CRYSTALS URNS MICRO: (no result)
EPI CELLS #/AREA URNS HPF: (no result) /[HPF]
GLUCOSE UR QL: (no result)
HGB UR QL STRIP: NEGATIVE
KETONES UR QL STRIP: NEGATIVE
LDLC SERPL DIRECT ASSAY-MCNC: 67 MG/DL (ref 9–159)
LEUKOCYTE ESTERASE UR QL STRIP: NEGATIVE
MUCOUS THREADS URNS QL MICRO: (no result)
NITRITE UR QL STRIP: NEGATIVE
PH UR STRIP: 5 [PH] (ref 4.5–8)
POTASSIUM SERPL-SCNC: 3.4 MMOL/L (ref 3.4–5.1)
PROT SERPL-MCNC: 7.2 GM/DL (ref 6–8)
SP GR UR: >= 1.03 (ref 1–1.03)
T VAGINALIS URNS QL MICRO: (no result)
T4 FREE SERPL-MCNC: 1.33 NG/DL (ref 0.89–1.76)
TRIGL SERPL-MCNC: 102 MG/DL (ref ?–150)
UROBILINOGEN UR STRIP-MCNC: 0.2 E.U./DL (ref 0–1)
YEAST #/AREA URNS HPF: (no result) /[HPF]